# Patient Record
Sex: FEMALE | Race: WHITE | Employment: OTHER | URBAN - METROPOLITAN AREA
[De-identification: names, ages, dates, MRNs, and addresses within clinical notes are randomized per-mention and may not be internally consistent; named-entity substitution may affect disease eponyms.]

---

## 2022-07-26 ENCOUNTER — DOCUMENTATION ONLY (OUTPATIENT)
Dept: ANTICOAGULATION | Facility: CLINIC | Age: 77
End: 2022-07-26

## 2022-07-26 DIAGNOSIS — I48.91 ATRIAL FIBRILLATION (H): Primary | ICD-10-CM

## 2022-07-26 DIAGNOSIS — Z79.01 LONG TERM CURRENT USE OF ANTICOAGULANT THERAPY: ICD-10-CM

## 2022-07-26 NOTE — PROGRESS NOTES
ANTICOAGULATION  MANAGEMENT    Kimberly Friedman is being discharged from the [facility] ([facility]).    Reason for discharge:     Anticoagulation episode resolved and ACC referral closed        Radha Marshall RN